# Patient Record
Sex: FEMALE | Race: WHITE | NOT HISPANIC OR LATINO | Employment: OTHER | URBAN - METROPOLITAN AREA
[De-identification: names, ages, dates, MRNs, and addresses within clinical notes are randomized per-mention and may not be internally consistent; named-entity substitution may affect disease eponyms.]

---

## 2017-04-25 ENCOUNTER — HISTORICAL (OUTPATIENT)
Dept: ADMINISTRATIVE | Facility: HOSPITAL | Age: 72
End: 2017-04-25

## 2021-08-02 ENCOUNTER — HISTORICAL (OUTPATIENT)
Dept: ADMINISTRATIVE | Facility: HOSPITAL | Age: 76
End: 2021-08-02

## 2022-04-09 ENCOUNTER — HISTORICAL (OUTPATIENT)
Dept: ADMINISTRATIVE | Facility: HOSPITAL | Age: 77
End: 2022-04-09

## 2022-04-25 VITALS
HEIGHT: 66 IN | WEIGHT: 178 LBS | DIASTOLIC BLOOD PRESSURE: 81 MMHG | SYSTOLIC BLOOD PRESSURE: 137 MMHG | BODY MASS INDEX: 28.61 KG/M2

## 2022-05-02 NOTE — HISTORICAL [facility]ER
This is a historical note converted from Yara. Formatting and pictures may have been removed.  Please reference Yara for original formatting and attached multimedia. Chief Complaint  here for right knee pain and swelling since 2014  History of Present Illness  76-year-old female presents office today for evaluation of her right knee pain.? When she made the appointment?couple weeks ago her pain?was there and it was medial sided.? Currently today she has no pain. ?She recently had a?spinal cord stimulator removed last week?and noted improvement in her symptoms?such as right knee pain.? She denies any locking, catching or giving way episodes  Review of Systems  Systemic: No fever, no chills, and no recent weight change.  Head: No headache - frequent.  Eyes: No vision problems.  Otolarnygeal: No hearing loss, no earache, no epistaxis, no hoarseness, and no tooth pain. Gums normal.  Cardiovascular: No chest pain or discomfort and no palpitations.  Pulmonary: No pulmonary symptoms - no dyspnea, no shortness of breath  Gastrointestinal: Appetite not decreased. No dysphagia and no constant eructation. No nausea, no vomiting, no abdominal pain, no hematochezia.  Genitourinary: No genitourinary symptoms - No urinary hesitancy. No urinary loss of control - no burning sensation during urination.  Musculoskeletal: No calf muscle cramps and no localized soft tissue swelling  Neurological: No fainting and no convulsions.  Psychological: no depression.  Skin: No rash.  Physical Exam  Vitals & Measurements  T:?36.2? ?C (Oral)? HR:?70(Peripheral)? BP:?137/81?  HT:?167.64?cm? WT:?80.730?kg? BMI:?28.73?  Right knee exam  Mild joint effusion but no erythema or increased heat  She has?no medial or lateral joint line tenderness  Mild patellofemoral crepitus  Range of motion is from 0 to 120 degrees  Negative medial and lateral Maxwell  Negative Lachman  No instability in varus or valgus stress  Sensation intact distally  Pedal  pulses 2+  Assessment/Plan  1.?Primary osteoarthritis of right knee?M17.11  ?Discussed with the patient today that she has mild osteoarthritis of the medial compartment. ?Currently she is asymptomatic. ?We have discussed corticosteroid injection and physical therapy if her symptoms?return. ?Follow-up with us as needed  Ordered:  Clinic Follow-up PRN, 08/02/21 10:43:00 CDT, Future Order, [facility]  ?  Referrals  Clinic Follow-up PRN, 08/02/21 10:43:00 CDT, Future Order, [facility]   Problem List/Past Medical History  Ongoing  Asthma  Chronic back pain  Hypothyroid  Primary osteoarthritis of right knee  Scoliosis  Umbilical hernia  Historical  Able to lie down  Asthma  Bilateral cataracts  Cancer of skin  Chronic back pain  Dependance on cane  Dependent on wheelchair  Discharge planning  DJD (degenerative joint disease)  ET - Exercise tolerance  Extremity numbness  Falls  GERD (gastroesophageal reflux disease)  Heart murmur  Numbness and tingling  Pneumonia  Shortness of breath  Thyroid disease  Wears glasses  Procedure/Surgical History  Revision or removal of implanted spinal neurostimulator pulse generator or  (07/30/2021)  Fluoroscopy of Spinal Cord using Other Contrast (08/04/2016)  Injection procedure for myelography and/or computed tomography, lumbar (08/04/2016)  71571 - INJ PARAVERT F JNT LUM / SAC 1 LEVEL (11/10/2015)  71444 - INJ PARAVERT F JNT LUM / SAC 2 LEVEL (11/10/2015)  27849 - INJ PARAVERT F JNT LUM / SAC > / = 3 LEVEL (11/10/2015)  Fluoroscopy of Lumbar Facet Joint(s) using Low Osmolar Contrast (11/10/2015)  Injection(s), diagnostic or therapeutic agent, paravertebral facet (zygapophyseal) joint (or nerves innervating that joint) with image guidance (fluoroscopy or CT), lumbar or sacral; second level (List separately in addition to code for primary procedure) (11/10/2015)  Injection(s), diagnostic or therapeutic agent, paravertebral facet (zygapophyseal) joint (or nerves innervating  that joint) with image guidance (fluoroscopy or CT), lumbar or sacral; single level (11/10/2015)  Injection(s), diagnostic or therapeutic agent, paravertebral facet (zygapophyseal) joint (or nerves innervating that joint) with image guidance (fluoroscopy or CT), lumbar or sacral; third and any additional level(s) (List separately in addition to code f (11/10/2015)  Introduction of Anti-inflammatory into Joints, Percutaneous Approach (11/10/2015)  Introduction of Local Anesthetic into Joints, Percutaneous Approach (11/10/2015)  Lumbar Decompression (Right) (04/29/2015)  Other exploration and decompression of spinal canal (04/29/2015)  Other exploration and decompression of spinal canal (04/29/2015)  Removal of implanted device from other (04/29/2015)  36938 - INJ TRIGGER POINT 1 / 2 MUSCL (Right) (03/26/2015)  Injection of steroid (03/26/2015)  Injection or infusion of other therapeutic or prophylactic substance (03/26/2015)  Injection(s); single or multiple trigger point(s), 1 or 2 muscle(s) (03/26/2015)  X-ray, other and unspecified (03/26/2015)  Aspiration of other soft tissue (11/20/2014)  Central Venous Catheter Placement with Guidance (11/20/2014)  Arterial catheterization (10/29/2014)  Arterial catheterization (10/29/2014)  Excision of intervertebral disc (10/29/2014)  Excision of intervertebral disc (10/29/2014)  Fusion or refusion of 4- 8 vertebrae (10/29/2014)  INSERTION OF INTERBODY SPINAL FUSION DEVICE (10/29/2014)  INSERTION OF INTERBODY SPINAL FUSION DEVICE (10/29/2014)  Lumbar and Lumbosacral Fusion of the Anterior Column, Anterior Technique (10/29/2014)  Lumbar and Lumbosacral Fusion of the Anterior Column, Anterior Technique (10/29/2014)  Lumbar and Lumbosacral Fusion of the Posterior Column, Posterior Technique (10/29/2014)  Lumbar Fusion With O-Arm (.) (10/29/2014)  Other exploration and decompression of spinal canal (10/29/2014)  Repair of vertebral fracture (10/29/2014)  Xtreme Lateral Interbody  Fusion W/Prone Instrumentation (.) (10/29/2014)  Other open umbilical herniorrhaphy (07/10/2013)  Repair umbilical hernia, age 5 years or older; reducible. (07/10/2013)  Hysterectomy (1974)  Bone Spur left Shoulder  [facility] - Extracapsular cataract extraction  Hernia repair  Neck Surgery  Pinched nerve in foot  Repair of rectocele  Repair Spinal Stenosis  Surgery on 5 Vertebra  Tonsillectomy   Medications  Colace 100 mg oral capsule, 100 mg= 1 cap(s), Oral, At Bedtime, PRN  LEVOTHYROXINE 100 MCG TABLET, 100 mcg= 1 tab(s), Oral, At Bedtime  Norco 10 mg-325 mg oral tablet, 1 tab(s), Oral, q4hr, PRN  ProAir HFA 90 mcg/inh inhalation aerosol with adapter, 1 puff(s), INH, q4hr, PRN  Singulair 10 mg oral TABLET, 10 mg= 1 tab(s), Oral, At Bedtime  sulfamethoxazole-trimethoprim 800 mg-160 mg oral tablet, 1 tab(s), Oral, q12hr  Symbicort 160 mcg-4.5 mcg/inh inhalation aerosol, 2 puff(s), INH, At Bedtime  Topamax 25 mg oral tablet, 1 tab, Oral, BID,? ?Not taking  Vitamin B Complex with C, Folic Acid, and Iron oral tablet, 1 tab(s), Oral, At Bedtime  vitamin E 400 intl units oral capsule, 400 IntUnit= 1 cap(s), Oral, At Bedtime  Allergies  Cymbalta?(Depression)  Lyrica?(Weakness)  gabapentin?(Depression)  tape  Social History  Abuse/Neglect  No, 08/02/2021  Alcohol - Denies Alcohol Use, 03/23/2015  Current, Beer, 1-2 times per month, 07/06/2013  Employment/School - No Risk, 03/26/2015  Employed, Work/School description: , Sr. . Highest education level: Some college., 07/07/2013  Home/Environment - No Risk, 03/26/2015  Lives with Spouse. Home equipment: Respiratory treatments, Walker/Cane, Wheelchair., 03/23/2015  Nutrition/Health - No Risk, 03/26/2015  Regular, 07/07/2013  Substance Use - Denies Substance Abuse, 03/23/2015  Tobacco - Denies Tobacco Use, 03/23/2015  Never (less than 100 in lifetime), N/A, 08/02/2021  Never smoker, 04/08/2016  Family History  CAD (coronary artery disease)...:  Father.  History of coronary artery bypass graft...: Father.  Metastatic cancer: Mother.  Health Maintenance  Health Maintenance  ???Pending?(in the next year)  ??? ??OverDue  ??? ? ? ?ADL Screening due??11/04/15??and every 1??year(s)  ??? ? ? ?Asthma Management-Spirometry due??11/18/15??and every 1??year(s)  ??? ? ? ?Aspirin Therapy for CVD Prevention due??11/19/15??and every 1??year(s)  ??? ? ? ?Advance Directive due??01/02/21??and every 1??year(s)  ??? ? ? ?Cognitive Screening due??01/02/21??and every 1??year(s)  ??? ? ? ?Functional Assessment due??01/02/21??and every 1??year(s)  ??? ??Due?  ??? ? ? ?Asthma Management-Asthma Education due??08/02/21??and every 6??month(s)  ??? ? ? ?Asthma Management-[place] Flow due??08/02/21??Variable frequency  ??? ? ? ?Asthma Management-Written Action Plan due??08/02/21??and every 6??month(s)  ??? ? ? ?Bone Density Screening due??08/02/21??Variable frequency  ??? ? ? ?Medicare Annual Wellness Exam due??08/02/21??and every 1??year(s)  ??? ? ? ?Tetanus Vaccine due??08/02/21??and every 10??year(s)  ??? ??Due In Future?  ??? ? ? ?Obesity Screening not due until??01/01/22??and every 1??year(s)  ??? ? ? ?Fall Risk Assessment not due until??01/02/22??and every 1??year(s)  ???Satisfied?(in the past 1 year)  ??? ??Satisfied?  ??? ? ? ?Blood Pressure Screening on??08/02/21.??Satisfied by Ankita Gomez LPN  ??? ? ? ?Body Mass Index Check on??08/02/21.??Satisfied by Ankita Gomez LPN  ??? ? ? ?Fall Risk Assessment on??08/02/21.??Satisfied by Ankita Gomez LPN  ??? ? ? ?Obesity Screening on??08/02/21.??Satisfied by Ankita Gomez LPN  ?

## 2024-04-30 ENCOUNTER — OFFICE VISIT (OUTPATIENT)
Dept: URGENT CARE | Facility: CLINIC | Age: 79
End: 2024-04-30
Payer: MEDICARE

## 2024-04-30 VITALS
RESPIRATION RATE: 18 BRPM | OXYGEN SATURATION: 97 % | TEMPERATURE: 98 F | SYSTOLIC BLOOD PRESSURE: 146 MMHG | BODY MASS INDEX: 34.02 KG/M2 | DIASTOLIC BLOOD PRESSURE: 77 MMHG | HEART RATE: 71 BPM | HEIGHT: 63 IN | WEIGHT: 192 LBS

## 2024-04-30 DIAGNOSIS — M54.41 CHRONIC RIGHT-SIDED LOW BACK PAIN WITH RIGHT-SIDED SCIATICA: ICD-10-CM

## 2024-04-30 DIAGNOSIS — G89.29 CHRONIC RIGHT-SIDED LOW BACK PAIN WITH RIGHT-SIDED SCIATICA: ICD-10-CM

## 2024-04-30 DIAGNOSIS — M54.16 LUMBAR RADICULOPATHY: Primary | ICD-10-CM

## 2024-04-30 PROCEDURE — 99213 OFFICE O/P EST LOW 20 MIN: CPT | Mod: 25,,,

## 2024-04-30 PROCEDURE — 96372 THER/PROPH/DIAG INJ SC/IM: CPT | Mod: ,,,

## 2024-04-30 RX ORDER — LEVOTHYROXINE SODIUM 100 UG/1
1 TABLET ORAL EVERY MORNING
COMMUNITY

## 2024-04-30 RX ORDER — HYDROCHLOROTHIAZIDE 25 MG/1
TABLET ORAL DAILY PRN
COMMUNITY

## 2024-04-30 RX ORDER — METHOCARBAMOL 500 MG/1
500 TABLET, FILM COATED ORAL 3 TIMES DAILY PRN
Qty: 15 TABLET | Refills: 0 | Status: SHIPPED | OUTPATIENT
Start: 2024-04-30 | End: 2024-05-05

## 2024-04-30 RX ORDER — MONTELUKAST SODIUM 10 MG/1
10 TABLET ORAL
COMMUNITY

## 2024-04-30 RX ORDER — BUDESONIDE AND FORMOTEROL FUMARATE DIHYDRATE 160; 4.5 UG/1; UG/1
AEROSOL RESPIRATORY (INHALATION)
COMMUNITY
Start: 2023-06-26

## 2024-04-30 RX ORDER — HYDROCODONE BITARTRATE AND ACETAMINOPHEN 10; 325 MG/1; MG/1
1 TABLET ORAL 4 TIMES DAILY PRN
COMMUNITY

## 2024-04-30 RX ORDER — BETAMETHASONE SODIUM PHOSPHATE AND BETAMETHASONE ACETATE 3; 3 MG/ML; MG/ML
6 INJECTION, SUSPENSION INTRA-ARTICULAR; INTRALESIONAL; INTRAMUSCULAR; SOFT TISSUE
Status: COMPLETED | OUTPATIENT
Start: 2024-04-30 | End: 2024-04-30

## 2024-04-30 RX ORDER — ALBUTEROL SULFATE 90 UG/1
AEROSOL, METERED RESPIRATORY (INHALATION)
COMMUNITY

## 2024-04-30 RX ADMIN — BETAMETHASONE SODIUM PHOSPHATE AND BETAMETHASONE ACETATE 6 MG: 3; 3 INJECTION, SUSPENSION INTRA-ARTICULAR; INTRALESIONAL; INTRAMUSCULAR; SOFT TISSUE at 11:04

## 2024-04-30 NOTE — PROGRESS NOTES
"Subjective:      Patient ID: Odilia Flood is a 78 y.o. female.    Vitals:  height is 5' 3" (1.6 m) and weight is 87.1 kg (192 lb). Her oral temperature is 98.1 °F (36.7 °C). Her blood pressure is 146/77 (abnormal) and her pulse is 71. Her respiration is 18 and oxygen saturation is 97%.     Chief Complaint: Other     Patient is a 78 y.o. female who presents to urgent care with complaints of right lower back pain with radiation down her leg and tingling over the last week. She has a history of chronic lumbar disease and previous spinal fusion. She is having a fusion repair on 5/28. Alleviating factors include norco for chronic pain and tylenol with no relief. Patient denies new numbness, bowel or bladder dysfunction, saddle anesthesia, or lower extremity weakness.         Constitution: Negative for fever.   HENT: Negative.     Neck: neck negative.   Eyes: Negative.    Respiratory: Negative.     Gastrointestinal:  Negative for bowel incontinence.   Genitourinary: Negative.  Negative for bladder incontinence.   Musculoskeletal:  Positive for back pain.   Skin: Negative.       Objective:     Physical Exam   Constitutional: She is oriented to person, place, and time. She appears well-developed. She is cooperative.  Non-toxic appearance. She does not appear ill. No distress.   HENT:   Head: Normocephalic and atraumatic.   Ears:   Right Ear: Hearing and external ear normal.   Left Ear: Hearing and external ear normal.   Mouth/Throat: Oropharynx is clear and moist and mucous membranes are normal. Mucous membranes are moist. Oropharynx is clear.   Eyes: Conjunctivae and lids are normal.   Neck: Trachea normal and phonation normal. Neck supple. No edema present. No erythema present. No neck rigidity present.   Cardiovascular: Normal rate.   Pulmonary/Chest: Effort normal and breath sounds normal. She has no decreased breath sounds.   Abdominal: Normal appearance.   Musculoskeletal:      Lumbar back: She exhibits decreased " range of motion (decreased flexion and rotation), tenderness and spasm. She exhibits no bony tenderness and no swelling.      Comments: Bilateral upper and lower extremity strength 5/5 and equal bilaterally   Right sided paraspinal tenderness to palpation; no bony tenderness    Neurological: no focal deficit. She is alert and oriented to person, place, and time. She exhibits normal muscle tone. Gait normal. Coordination normal. GCS eye subscore is 4. GCS verbal subscore is 5. GCS motor subscore is 6.   Skin: Skin is warm, dry, intact, not diaphoretic and no rash.   Psychiatric: Her speech is normal and behavior is normal. Mood normal.   Nursing note and vitals reviewed.      Assessment:     1. Lumbar radiculopathy    2. Chronic right-sided low back pain with right-sided sciatica        Plan:       Lumbar radiculopathy    Chronic right-sided low back pain with right-sided sciatica    Other orders  -     betamethasone acetate-betamethasone sodium phosphate injection 6 mg  -     methocarbamoL (ROBAXIN) 500 MG Tab; Take 1 tablet (500 mg total) by mouth 3 (three) times daily as needed (lower back spasms).  Dispense: 15 tablet; Refill: 0    Take medications only as prescribed as they may cause sedation (safety precautions given) robaxin; do not take at the same time as your norco   Drink plenty of fluids and get plenty of rest.  Take medication with food.   Avoid strenuous lifting, use proper body mechanics.  Apply heating pad, Ice pack, Biofreeze or Epsom salt baths as needed.  Follow-up with Dr. Parikh for further instructions.   Present to the Emergency Department with any significant change or worsening symptoms.         The patient reports that she called her spine doctor, Dr. Daniel Parikh this am and was instructed to come to the urgent care for a steroid injection. I called and spoke to his nurse and she verified this information.

## 2024-04-30 NOTE — PATIENT INSTRUCTIONS
Take medications only as prescribed as they may cause sedation (safety precautions given) robaxin; do not take at the same time as your norco   Drink plenty of fluids and get plenty of rest.  Take medication with food.   Avoid strenuous lifting, use proper body mechanics.  Apply heating pad, Ice pack, Biofreeze or Epsom salt baths as needed.  Follow-up with Dr. Parikh for further instructions.   Present to the Emergency Department with any significant change or worsening symptoms.

## 2024-10-16 ENCOUNTER — HOSPITAL ENCOUNTER (EMERGENCY)
Facility: HOSPITAL | Age: 79
Discharge: HOME | End: 2024-10-16
Attending: STUDENT IN AN ORGANIZED HEALTH CARE EDUCATION/TRAINING PROGRAM
Payer: MEDICARE

## 2024-10-16 VITALS
SYSTOLIC BLOOD PRESSURE: 138 MMHG | HEART RATE: 69 BPM | WEIGHT: 180 LBS | BODY MASS INDEX: 28.93 KG/M2 | TEMPERATURE: 98 F | OXYGEN SATURATION: 98 % | HEIGHT: 66 IN | DIASTOLIC BLOOD PRESSURE: 56 MMHG | RESPIRATION RATE: 11 BRPM

## 2024-10-16 DIAGNOSIS — K64.9 HEMORRHOIDS, UNSPECIFIED HEMORRHOID TYPE: Primary | ICD-10-CM

## 2024-10-16 DIAGNOSIS — K59.00 CONSTIPATION: ICD-10-CM

## 2024-10-16 PROCEDURE — 25000003 PHARM REV CODE 250: Performed by: PHYSICIAN ASSISTANT

## 2024-10-16 PROCEDURE — 99283 EMERGENCY DEPT VISIT LOW MDM: CPT | Mod: 25

## 2024-10-16 RX ORDER — HYDROCORTISONE ACETATE PRAMOXINE HCL 1; 1 G/100G; G/100G
CREAM TOPICAL 2 TIMES DAILY
Qty: 30 G | Refills: 2 | Status: SHIPPED | OUTPATIENT
Start: 2024-10-16

## 2024-10-16 RX ORDER — HYDROCORTISONE ACETATE PRAMOXINE HCL 1; 1 G/100G; G/100G
CREAM TOPICAL
Status: COMPLETED | OUTPATIENT
Start: 2024-10-16 | End: 2024-10-16

## 2024-10-16 RX ADMIN — HYDROCORTISONE ACETATE PRAMOXINE HCL: 1; 1 CREAM TOPICAL at 02:10

## 2024-10-16 NOTE — DISCHARGE INSTRUCTIONS
If you are unable to have successful bowel movement in the next 48, you must return to the ED. Drink plenty water.

## 2024-10-16 NOTE — ED PROVIDER NOTES
"Encounter Date: 10/16/2024       History     Chief Complaint   Patient presents with    Hemorrhoids     Pt c/o constipation since Saturday & rectal pain d/t hemorrhoids. Denies rectal bleeding. Pt states last BM was Saturday and it was hard & small. Hx of back surgery in May this year. Pt states taking pain meds x5 a day.     Constipation     See MDM for details.      The history is provided by the patient. No  was used.     Review of patient's allergies indicates:   Allergen Reactions    Adhesive tape-silicones Other (See Comments)    Duloxetine      Other Reaction(s): Depression    Latex     Pregabalin Other (See Comments)     Other Reaction(s): Weakness    "Cannot walk after taking this med".     Past Medical History:   Diagnosis Date    Asthma     Thyroid disease      Past Surgical History:   Procedure Laterality Date    back surgeries      x 5    HERNIA REPAIR      HYSTERECTOMY      REPAIR OF RECTOCELE      SHOULDER SURGERY Left     SPINAL CORD STIMULATOR IMPLANT      SPINAL CORD STIMULATOR REMOVAL      surgery for nerve in neck      TONSILLECTOMY       Family History   Problem Relation Name Age of Onset    Cancer Mother      Heart failure Father       Social History     Tobacco Use    Smoking status: Never    Smokeless tobacco: Never   Substance Use Topics    Alcohol use: Not Currently    Drug use: Never     Review of Systems   Constitutional: Negative.    HENT: Negative.     Eyes: Negative.    Respiratory: Negative.     Cardiovascular: Negative.    Gastrointestinal:  Positive for constipation and rectal pain. Negative for abdominal pain, blood in stool, diarrhea, nausea and vomiting.   Genitourinary: Negative.    Musculoskeletal: Negative.    Neurological: Negative.        Physical Exam     Initial Vitals [10/16/24 1139]   BP Pulse Resp Temp SpO2   128/67 80 18 98.1 °F (36.7 °C) 98 %      MAP       --         Physical Exam    Nursing note and vitals reviewed.  Constitutional: She appears " well-developed and well-nourished. No distress.   HENT:   Head: Normocephalic and atraumatic.   Eyes: Conjunctivae, EOM and lids are normal. Pupils are equal, round, and reactive to light.   Neck: Neck supple.   Normal range of motion.  Cardiovascular:  Normal rate and regular rhythm.           Pulmonary/Chest: Effort normal and breath sounds normal.   Abdominal: Abdomen is soft and flat. She exhibits no distension and no mass. Bowel sounds are increased. There is no abdominal tenderness. There is no rebound and no guarding.   Musculoskeletal:      Cervical back: Normal range of motion and neck supple.     Neurological: She is alert and oriented to person, place, and time. She has normal strength. She is not disoriented. No cranial nerve deficit or sensory deficit. GCS eye subscore is 4. GCS verbal subscore is 5. GCS motor subscore is 6.   Skin: Skin is warm and intact.   Psychiatric: She has a normal mood and affect. Her speech is normal and behavior is normal. Judgment and thought content normal. Cognition and memory are normal.         ED Course   Procedures  Labs Reviewed - No data to display       Imaging Results              X-Ray Abdomen Flat And Erect (Final result)  Result time 10/16/24 12:58:56      Final result by Wesley Holley MD (10/16/24 12:58:56)                   Narrative:    EXAMINATION  XR ABDOMEN FLAT AND ERECT    CLINICAL HISTORY  Constipation, unspecified    TECHNIQUE  A total of 4 images submitted of the abdomen.    COMPARISON  None available at the time of initial interpretation.    FINDINGS  Lines/tubes/devices: none present    There is no evidence of free intraperitoneal air beneath the hemidiaphragms.  Nonspecific bowel gas pattern is present, with no abnormal air-fluid levels or findings to suggest mechanical bowel obstruction.  No intra-abdominal mass effect is evident. A 1.4 cm rounded calcific opacity projects over the left upper quadrant, otherwise of indeterminate localization and  etiology.    The visualized lung bases and cardiac contour are unremarkable. No acute osseous abnormality is identified.  There are degenerative changes throughout the visualized spinal column and bony pelvis, as well as extensive bilateral multilevel thoracolumbar and SI joint fusion hardware.    IMPRESSION  1. No convincing acute intra-abdominal process.  2. Incidental left upper quadrant calcification is of otherwise indeterminate etiology and localization, could represent splenic, renal, or vascular structure.  3. Additional chronic secondary findings discussed above.      Electronically signed by: Wesley Holley  Date:    10/16/2024  Time:    12:58                                     Medications   pramoxine-hydrocortisone rectal cream ( Rectal Given 10/16/24 1430)     Medical Decision Making  79-year-old female presents to the emergency room with hemorrhoids and constipation.  States she has not had a good bowel movement since Wednesday last week.  Denies nausea, vomiting, fever, chills, chest pain, or difficulty breathing.  Patient states she does have positive flatus but just can not move the stool because of the hemorrhoids. No rectal bleeding at this time. Patient has not put anything on the hemorrhoids at this time.  Patient has taken Linzess since this began.  States she has been taking narcotics due to back surgery months ago and has been diligent about taking appropriate medicines.    Problems Addressed:  Constipation: chronic illness or injury with exacerbation, progression, or side effects of treatment     Details: Differential diagnosis included but not limited to:  Bowel obstruction, hemorrhoids, constipation, impaction    See workup note. Patient feels comfortable leaving to home at this time. Strict ER precautions give. Dr. Hughes agrees with plan.    Amount and/or Complexity of Data Reviewed  Radiology: ordered. Decision-making details documented in ED Course.    Risk  OTC drugs.  Prescription  drug management.               ED Course as of 10/16/24 1557   Wed Oct 16, 2024   1553 Patient had small BM prior to discharge. States she feels significant improvement with cream/ointment. She has had more Bms again. Declined attempt at manual disimpaction again/enema. States she wants to go home and go to the bathroom. She was having only mucous in stool now she's having actual stool [ST]   1554 I had initially did a digital exam to remove stool/FOBT. Not successful. Attempted soap/suds enema with some improvement but discontinued due to pain. Applied proctofoam and now patient is able to have successful BM. She would like to go and make no further manual attempts.  [ST]   1556 Narrative & Impression  EXAMINATION  XR ABDOMEN FLAT AND ERECT     CLINICAL HISTORY  Constipation, unspecified     TECHNIQUE  A total of 4 images submitted of the abdomen.     COMPARISON  None available at the time of initial interpretation.     FINDINGS  Lines/tubes/devices: none present     There is no evidence of free intraperitoneal air beneath the hemidiaphragms.  Nonspecific bowel gas pattern is present, with no abnormal air-fluid levels or findings to suggest mechanical bowel obstruction.  No intra-abdominal mass effect is evident. A 1.4 cm rounded calcific opacity projects over the left upper quadrant, otherwise of indeterminate localization and etiology.     The visualized lung bases and cardiac contour are unremarkable. No acute osseous abnormality is identified.  There are degenerative changes throughout the visualized spinal column and bony pelvis, as well as extensive bilateral multilevel thoracolumbar and SI joint fusion hardware.     IMPRESSION  1. No convincing acute intra-abdominal process.  2. Incidental left upper quadrant calcification is of otherwise indeterminate etiology and localization, could represent splenic, renal, or vascular structure.  3. Additional chronic secondary findings discussed above.         Electronically signed by:Wesley Holley  Date:                                            10/16/2024  Time:                                           12:58      Exam Ended: 10/16/24 12:08 CDT Last Resulted: 10/16/24 12:58 CDT   [ST]      ED Course User Index  [ST] Mara Michael PA                           Clinical Impression:  Final diagnoses:  [K59.00] Constipation  [K64.9] Hemorrhoids, unspecified hemorrhoid type (Primary)          ED Disposition Condition    Discharge Stable        This note was typed partially using voice recognition software.  Please be reminded that not all corrections/addendums to grammar may have been made prior to closing of this chart.    ED Prescriptions       Medication Sig Dispense Start Date End Date Auth. Provider    pramoxine-hydrocortisone (PROCTOCREAM-HC) 1-1 % rectal cream Place rectally 2 (two) times daily. 30 g 10/16/2024 -- Mara Michael PA          Follow-up Information       Follow up With Specialties Details Why Contact Info    Sumit So MD Family Medicine Schedule an appointment as soon as possible for a visit today  [address] [facility] [address]  [phone]      [facility] - Emergency Dept Emergency Medicine  As needed, If symptoms worsen [address] [facility] [id number] [phone]             Mara Michael PA  10/16/24 1558

## 2024-10-16 NOTE — FIRST PROVIDER EVALUATION
"Medical screening examination initiated.  I have conducted a focused provider triage encounter, findings are as follows:    Brief history of present illness:  arrived to ED due to constipation and hemorrhoids. Takes pain medication for chronic pain. LBM: 10/12/24.    Vitals:    10/16/24 1139   BP: 128/67   Pulse: 80   Resp: 18   Temp: 98.1 °F (36.7 °C)   TempSrc: Oral   SpO2: 98%   Weight: 81.6 kg (180 lb)   Height: 5' 6" (1.676 m)       Pertinent physical exam:  awake, alert, has non-labored breathing, ambulatory into triage    Brief workup plan:  imaging     Preliminary workup initiated; this workup will be continued and followed by the physician or advanced practice provider that is assigned to the patient when roomed.  "